# Patient Record
(demographics unavailable — no encounter records)

---

## 2025-07-17 NOTE — CONSULT LETTER
[Dear  ___] : Dear  [unfilled], [Courtesy Letter:] : I had the pleasure of seeing your patient, [unfilled], in my office today. [Please see my note below.] : Please see my note below. [Consult Closing:] : Thank you very much for allowing me to participate in the care of this patient.  If you have any questions, please do not hesitate to contact me. [Sincerely,] : Sincerely, [FreeTextEntry2] : Steve Brunner  [address], [place]

## 2025-07-17 NOTE — REASON FOR VISIT
[Initial Evaluation] : an initial evaluation for [Throat Infections] : throat infections [Family Member] : family member [Mother] : mother [Nasal Obstruction] : nasal obstruction [Sleep Apnea/ Snoring] : sleep apnea/ snoring

## 2025-07-17 NOTE — PHYSICAL EXAM
[3+] : 3+ [Normal Gait and Station] : normal gait and station [Normal muscle strength, symmetry and tone of facial, head and neck musculature] : normal muscle strength, symmetry and tone of facial, head and neck musculature [Normal] : no cervical lymphadenopathy [Complete] : complete cerumen impaction [Moderate] : moderate left inferior turbinate hypertrophy [Increased Work of Breathing] : no increased work of breathing with use of accessory muscles and retractions [FreeTextEntry9] : Mild OE

## 2025-07-17 NOTE — BIRTH HISTORY
[At ___ Weeks Gestation] : at [unfilled] weeks gestation [Normal Vaginal Route] : by normal vaginal route [Passed] : passed [de-identified] : NICU to monitor - no intubation - CPAP

## 2025-07-17 NOTE — HISTORY OF PRESENT ILLNESS
[No Personal or Family History of Easy Bruising, Bleeding, or Issues with General Anesthesia] : No Personal or Family History of easy bruising, bleeding, or issues with general anesthesia [de-identified] : Trixie is a 9-year-old with hx of throat infections and tonsillar hypertrophy 3-4 strep infections in the past year  has had at least 3 strep infections each year for the past 3 years   Snores at night  Pauses and gasping breaths  Wakes up but falls back to sleep  Restless sleeper  Daytime tiredness- wakes up exhausted  No ADHD  No bedwetting  +Chronic nasal congestion Sleeps open mouth breather  Needs to reposition to stop snoring  Short period of Flonase  No PSG   2-3 ear infections  No speech delay  No hearing concerns Passed  hearing test   No hx of asthma, RAD or wheezing  used Albuterol as an infant - uses 1-2 months a year   Left sided ear infection - treated for swimmers ear  Ciprodex drops since   No otorrhea but still complaining of pain

## 2025-07-17 NOTE — PROCEDURE
[FreeTextEntry1] :  Cerumen Impaction Left Ear  [FreeTextEntry2] :  Cerumen Impaction Left Ear  [FreeTextEntry3] : PROCEDURE:  Cerumen Removal Left Ear   After informed verbal consent is obtained, binocular microscopy is used to remove cerumen from the left ear canal with a curette and suction.